# Patient Record
Sex: FEMALE | Employment: PART TIME | URBAN - METROPOLITAN AREA
[De-identification: names, ages, dates, MRNs, and addresses within clinical notes are randomized per-mention and may not be internally consistent; named-entity substitution may affect disease eponyms.]

---
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
## 2019-06-23 ENCOUNTER — OFFICE VISIT (OUTPATIENT)
Dept: FAMILY MEDICINE CLINIC | Age: 19
End: 2019-06-23

## 2019-06-23 VITALS
OXYGEN SATURATION: 96 % | WEIGHT: 144 LBS | SYSTOLIC BLOOD PRESSURE: 98 MMHG | HEART RATE: 87 BPM | HEIGHT: 57 IN | RESPIRATION RATE: 20 BRPM | DIASTOLIC BLOOD PRESSURE: 64 MMHG | TEMPERATURE: 98.6 F | BODY MASS INDEX: 31.07 KG/M2

## 2019-06-23 DIAGNOSIS — J02.9 SORE THROAT: Primary | ICD-10-CM

## 2019-06-23 DIAGNOSIS — R22.31 AXILLARY MASS, RIGHT: ICD-10-CM

## 2019-06-23 DIAGNOSIS — N64.4 BREAST TENDERNESS IN FEMALE: ICD-10-CM

## 2019-06-23 DIAGNOSIS — L03.111 CELLULITIS OF AXILLA, RIGHT: ICD-10-CM

## 2019-06-23 DIAGNOSIS — N61.0 MASTITIS IN FEMALE: ICD-10-CM

## 2019-06-23 LAB
S PYO AG THROAT QL: NEGATIVE
VALID INTERNAL CONTROL?: YES

## 2019-06-23 RX ORDER — DOXYCYCLINE 100 MG/1
100 CAPSULE ORAL 2 TIMES DAILY
Qty: 20 CAP | Refills: 0 | Status: SHIPPED | OUTPATIENT
Start: 2019-06-23 | End: 2019-07-03

## 2019-06-23 NOTE — PATIENT INSTRUCTIONS
Sore Throat: Care Instructions  Your Care Instructions    Infection by bacteria or a virus causes most sore throats. Cigarette smoke, dry air, air pollution, allergies, and yelling can also cause a sore throat. Sore throats can be painful and annoying. Fortunately, most sore throats go away on their own. If you have a bacterial infection, your doctor may prescribe antibiotics. Follow-up care is a key part of your treatment and safety. Be sure to make and go to all appointments, and call your doctor if you are having problems. It's also a good idea to know your test results and keep a list of the medicines you take. How can you care for yourself at home? · If your doctor prescribed antibiotics, take them as directed. Do not stop taking them just because you feel better. You need to take the full course of antibiotics. · Gargle with warm salt water once an hour to help reduce swelling and relieve discomfort. Use 1 teaspoon of salt mixed in 1 cup of warm water. · Take an over-the-counter pain medicine, such as acetaminophen (Tylenol), ibuprofen (Advil, Motrin), or naproxen (Aleve). Read and follow all instructions on the label. · Be careful when taking over-the-counter cold or flu medicines and Tylenol at the same time. Many of these medicines have acetaminophen, which is Tylenol. Read the labels to make sure that you are not taking more than the recommended dose. Too much acetaminophen (Tylenol) can be harmful. · Drink plenty of fluids. Fluids may help soothe an irritated throat. Hot fluids, such as tea or soup, may help decrease throat pain. · Use over-the-counter throat lozenges to soothe pain. Regular cough drops or hard candy may also help. These should not be given to young children because of the risk of choking. · Do not smoke or allow others to smoke around you. If you need help quitting, talk to your doctor about stop-smoking programs and medicines.  These can increase your chances of quitting for good. · Use a vaporizer or humidifier to add moisture to your bedroom. Follow the directions for cleaning the machine. When should you call for help? Call your doctor now or seek immediate medical care if:    · You have new or worse trouble swallowing.     · Your sore throat gets much worse on one side.    Watch closely for changes in your health, and be sure to contact your doctor if you do not get better as expected. Where can you learn more? Go to http://jorge-ashok.info/. Enter [id number] in the search box to learn more about \"Sore Throat: Care Instructions. \"  Current as of: March 27, 2018  Content Version: 11.9  © 5190-0291 [employer]. Care instructions adapted under license by [employer] (which disclaims liability or warranty for this information). If you have questions about a medical condition or this instruction, always ask your healthcare professional. [place] [address] any warranty or liability for your use of this information. Cellulitis: Care Instructions  Your Care Instructions    Cellulitis is a skin infection caused by bacteria, most often strep or staph. It often occurs after a break in the skin from a scrape, cut, bite, or puncture, or after a rash. Cellulitis may be treated without doing tests to find out what caused it. But your doctor may do tests, if needed, to look for a specific bacteria, like methicillin-resistant Staphylococcus aureus (MRSA). The doctor has checked you carefully, but problems can develop later. If you notice any problems or new symptoms, get medical treatment right away. Follow-up care is a key part of your treatment and safety. Be sure to make and go to all appointments, and call your doctor if you are having problems. It's also a good idea to know your test results and keep a list of the medicines you take. How can you care for yourself at home? · Take your antibiotics as directed.  Do not stop taking them just because you feel better. You need to take the full course of antibiotics. · Prop up the infected area on pillows to reduce pain and swelling. Try to keep the area above the level of your heart as often as you can. · If your doctor told you how to care for your wound, follow your doctor's instructions. If you did not get instructions, follow this general advice:  ? Wash the wound with clean water 2 times a day. Don't use hydrogen peroxide or alcohol, which can slow healing. ? You may cover the wound with a thin layer of petroleum jelly, such as Vaseline, and a nonstick bandage. ? Apply more petroleum jelly and replace the bandage as needed. · Be safe with medicines. Take pain medicines exactly as directed. ? If the doctor gave you a prescription medicine for pain, take it as prescribed. ? If you are not taking a prescription pain medicine, ask your doctor if you can take an over-the-counter medicine. To prevent cellulitis in the future  · Try to prevent cuts, scrapes, or other injuries to your skin. Cellulitis most often occurs where there is a break in the skin. · If you get a scrape, cut, mild burn, or bite, wash the wound with clean water as soon as you can to help avoid infection. Don't use hydrogen peroxide or alcohol, which can slow healing. · If you have swelling in your legs (edema), support stockings and good skin care may help prevent leg sores and cellulitis. · Take care of your feet, especially if you have diabetes or other conditions that increase the risk of infection. Wear shoes and socks. Do not go barefoot. If you have athlete's foot or other skin problems on your feet, talk to your doctor about how to treat them. When should you call for help? Call your doctor now or seek immediate medical care if:    · You have signs that your infection is getting worse, such as:  ? Increased pain, swelling, warmth, or redness. ? Red streaks leading from the area.   ? Pus draining from the area. ? A fever.     · You get a rash.    Watch closely for changes in your health, and be sure to contact your doctor if:    · You do not get better as expected. Where can you learn more? Go to http://jorge-ashok.info/. Raiza Kaufman in the search box to learn more about \"Cellulitis: Care Instructions. \"  Current as of: April 17, 2018  Content Version: 11.9  © 2967-2395 [employer]. Care instructions adapted under license by [employer] (which disclaims liability or warranty for this information). If you have questions about a medical condition or this instruction, always ask your healthcare professional. [place] [address] any warranty or liability for your use of this information.

## 2019-06-23 NOTE — PROGRESS NOTES
Ne Sheridan is a 22 y.o. female here today to address the following issues:  Chief Complaint   Patient presents with    Sore Throat     lump under right axillary, redness bilaterally of both eyes x 2 days with film covering eyes, painful to swallowing x 1 month. Also complaining of of swelling in right axillary region, for the past week. 4 days ago finished period. No redness or drainage noted. No fevers. She does shave her armpits. Also with some right sided breast tenderness    Sore throat for the past month. Eyes are red, but not itchy  Crusty red eyes. No sick contacts. Past Medical History:   Diagnosis Date    Headache disorder      No past surgical history on file.   Social History     Socioeconomic History    Marital status: SINGLE     Spouse name: Not on file    Number of children: Not on file    Years of education: Not on file    Highest education level: Not on file   Occupational History    Not on file   Social Needs    Financial resource strain: Not on file    Food insecurity:     Worry: Not on file     Inability: Not on file    Transportation needs:     Medical: Not on file     Non-medical: Not on file   Tobacco Use    Smoking status: Never Smoker    Smokeless tobacco: Never Used   Substance and Sexual Activity    Alcohol use: No    Drug use: No    Sexual activity: Never   Lifestyle    Physical activity:     Days per week: Not on file     Minutes per session: Not on file    Stress: Not on file   Relationships    Social connections:     Talks on phone: Not on file     Gets together: Not on file     Attends Congregational service: Not on file     Active member of club or organization: Not on file     Attends meetings of clubs or organizations: Not on file     Relationship status: Not on file    Intimate partner violence:     Fear of current or ex partner: Not on file     Emotionally abused: Not on file     Physically abused: Not on file     Forced sexual activity: Not on file   Other Topics Concern    Not on file   Social History Narrative    Not on file       No Known Allergies    Current Outpatient Medications   Medication Sig    doxycycline (VIBRAMYCIN) 100 mg capsule Take 1 Cap by mouth two (2) times a day for 10 days.  polyethylene glycol (MIRALAX) 17 gram/dose powder Put 8 capfuls of Miralax in a 32 ounce beverage and drink it, followed by 3 capfuls twice daily for the next week and follow up with your primary care physician     No current facility-administered medications for this visit. Review of Systems   Constitutional: Negative for chills and fever. HENT: Positive for sore throat. Negative for congestion and nosebleeds. Eyes: Positive for discharge. Respiratory: Negative for shortness of breath and wheezing. Cardiovascular: Negative for chest pain, palpitations and leg swelling. Gastrointestinal: Negative for abdominal pain, diarrhea, nausea and vomiting. Neurological: Negative for sensory change and speech change. Psychiatric/Behavioral: Negative for depression and suicidal ideas. A comprehensive review of systems was negative except for that written in the HPI and listed above. Visit Vitals  BP 98/64 (BP 1 Location: Left arm, BP Patient Position: Sitting)   Pulse 87   Temp 98.6 °F (37 °C) (Oral)   Resp 20   Ht 4' 9\" (1.448 m)   Wt 144 lb (65.3 kg)   SpO2 96%   BMI 31.16 kg/m²       Physical Exam   Constitutional: She is well-developed, well-nourished, and in no distress. No distress. HENT:   Head: Normocephalic and atraumatic. Right Ear: External ear normal.   Left Ear: External ear normal.   Nose: Nose normal.   Mouth/Throat: Oropharynx is clear and moist. No oropharyngeal exudate. Eyes: Conjunctivae are normal. Right eye exhibits no discharge. Left eye exhibits no discharge. Cardiovascular: Normal rate, regular rhythm and normal heart sounds. Exam reveals no gallop and no friction rub.    No murmur heard.  Pulmonary/Chest: Effort normal and breath sounds normal. No respiratory distress. She has no wheezes. She has no rales. Abdominal: Soft. Lymphadenopathy:     She has no cervical adenopathy. Neurological: She is alert. Skin: Skin is warm and dry. She is not diaphoretic. Psychiatric: Affect normal.   Nursing note and vitals reviewed. Breasts: right abnormal mass palpable in the right axilla, breast tenderness noted around 6 o clock as well, with no palpable     INDICATION: Possible cellulitis versus abscess vs mass      Using a [employer] S7 ultrasound system, a linear and curved probe was used to evaluate the right axilla. There was a 2.71 cm x 3.25 cm x 2.1 cm non-discrete, anechoic mass noted. Tender to palpation, but no surrounding or internal hyperemia or doppler positivity noted. Mild subcutaneous edema appreciated along axilla and right breast.  Does not appear to be a lymph node. Permanent images were captured and electronically archived. IMPRESSION: Cellulitis with no abscess appreciated        The credentialed provider supervising this exam was Dr. Oralia Foster MD, [facility] Eastman      Recent Results (from the past 12 hour(s))   AMB POC RAPID STREP A    Collection Time: 06/23/19  1:55 PM   Result Value Ref Range    VALID INTERNAL CONTROL POC Yes     Group A Strep Ag Negative Negative       1. Sore throat  - AMB POC RAPID STREP A    2. Axillary mass, right  - US BREAST AXILLA RT; Future    3. Cellulitis of axilla, right  - doxycycline (VIBRAMYCIN) 100 mg capsule; Take 1 Cap by mouth two (2) times a day for 10 days. Dispense: 20 Cap; Refill: 0    4. Breast tenderness in female    5. Mastitis in female    Will start doxycycline. No abscess or clear cyst appreciated on ultrasound today. We will also get a formal ultrasound to assess for this lesion after starting antibiotics. Rapid strep was negative.   Informed to follow-up in the next few days if any worsening pain, swelling, fever, or any other concerns. Follow-up and Dispositions    · Return in about 2 days (around 6/25/2019), or if symptoms worsen or fail to improve.            Haily Teixeira MD, CAQSM, RMSK

## 2019-06-23 NOTE — PROGRESS NOTES
Identified Patient with two Patient identifiers (Name and ). Two Patient Identifiers confirmed. Reviewed record in preparation for visit and have obtained necessary documentation. No chief complaint on file. There were no vitals taken for this visit. 1. Have you been to the ER, urgent care clinic since your last visit? Hospitalized since your last visit? {Yes when where and reason for visit:} 2. Have you seen or consulted any other health care providers outside of the [address] [place] [address] since your last visit? Include any pap smears or colon screening. {Yes when where and reason for visit:}

## 2019-06-23 NOTE — PROGRESS NOTES
Chief Complaint   Patient presents with    Sore Throat     lump under right axillary, redness bilaterally of both eyes x 2 days with film covering eyes, painful to swallowing x 1 month. 1. Have you been to the ER, urgent care clinic since your last visit? Hospitalized since your last visit? No      2. Have you seen or consulted any other health care providers outside of the [address] [place] [address] since your last visit? Include any pap smears or colon screening.  No      Visit Vitals  BP 98/64 (BP 1 Location: Left arm, BP Patient Position: Sitting)   Pulse 87   Temp 98.6 °F (37 °C) (Oral)   Resp 20   Ht 4' 9\" (1.448 m)   Wt 144 lb (65.3 kg)   SpO2 96%   BMI 31.16 kg/m²

## 2019-10-02 ENCOUNTER — OFFICE VISIT (OUTPATIENT)
Dept: FAMILY MEDICINE CLINIC | Age: 19
End: 2019-10-02

## 2019-10-02 VITALS
HEART RATE: 88 BPM | SYSTOLIC BLOOD PRESSURE: 103 MMHG | WEIGHT: 148 LBS | OXYGEN SATURATION: 97 % | TEMPERATURE: 98.1 F | BODY MASS INDEX: 31.93 KG/M2 | RESPIRATION RATE: 18 BRPM | DIASTOLIC BLOOD PRESSURE: 66 MMHG | HEIGHT: 57 IN

## 2019-10-02 DIAGNOSIS — R11.10 VOMITING IN ADULT: ICD-10-CM

## 2019-10-02 DIAGNOSIS — N94.6 SEVERE DYSMENORRHEA: Primary | ICD-10-CM

## 2019-10-02 LAB
HCG URINE, QL. (POC): NEGATIVE
VALID INTERNAL CONTROL?: YES

## 2019-10-02 RX ORDER — IBUPROFEN 600 MG/1
600 TABLET ORAL
Qty: 60 TAB | Refills: 1 | OUTPATIENT
Start: 2019-10-02 | End: 2021-10-19

## 2019-10-02 RX ORDER — ONDANSETRON 4 MG/1
4 TABLET, ORALLY DISINTEGRATING ORAL
Qty: 20 TAB | Refills: 1 | Status: SHIPPED | OUTPATIENT
Start: 2019-10-02

## 2019-10-02 NOTE — LETTER
NOTIFICATION RETURN TO WORK / SCHOOL 
 
10/2/2019 5:30 PM 
 
Ms. Bo James [address] [address] To Whom It May Concern: 
 
Bo James is currently under the care of [facility]. She will return to work/school on: 10/3/2019 If there are questions or concerns please have the patient contact our office. Sincerely, Onur Norman MD

## 2019-10-02 NOTE — PATIENT INSTRUCTIONS
Nausea and Vomiting: Care Instructions  Your Care Instructions    When you are nauseated, you may feel weak and sweaty and notice a lot of saliva in your mouth. Nausea often leads to vomiting. Most of the time you do not need to worry about nausea and vomiting, but they can be signs of other illnesses. Two common causes of nausea and vomiting are stomach flu and food poisoning. Nausea and vomiting from viral stomach flu will usually start to improve within 24 hours. Nausea and vomiting from food poisoning may last from 12 to 48 hours. The doctor has checked you carefully, but problems can develop later. If you notice any problems or new symptoms, get medical treatment right away. Follow-up care is a key part of your treatment and safety. Be sure to make and go to all appointments, and call your doctor if you are having problems. It's also a good idea to know your test results and keep a list of the medicines you take. How can you care for yourself at home? · To prevent dehydration, drink plenty of fluids, enough so that your urine is light yellow or clear like water. Choose water and other caffeine-free clear liquids until you feel better. If you have kidney, heart, or liver disease and have to limit fluids, talk with your doctor before you increase the amount of fluids you drink. · Rest in bed until you feel better. · When you are able to eat, try clear soups, mild foods, and liquids until all symptoms are gone for 12 to 48 hours. Other good choices include dry toast, crackers, cooked cereal, and gelatin dessert, such as Jell-O. When should you call for help? Call 911 anytime you think you may need emergency care. For example, call if:    · You passed out (lost consciousness).    Call your doctor now or seek immediate medical care if:    · You have symptoms of dehydration, such as:  ? Dry eyes and a dry mouth. ? Passing only a little dark urine. ?  Feeling thirstier than usual.     · You have new or worsening belly pain.     · You have a new or higher fever.     · You vomit blood or what looks like coffee grounds.    Watch closely for changes in your health, and be sure to contact your doctor if:    · You have ongoing nausea and vomiting.     · Your vomiting is getting worse.     · Your vomiting lasts longer than 2 days.     · You are not getting better as expected. Where can you learn more? Go to http://jorge-ashok.info/. Enter 25 [id number] in the search box to learn more about \"Nausea and Vomiting: Care Instructions. \"  Current as of: June 26, 2019  Content Version: 12.2  © 5824-0325 [employer]. Care instructions adapted under license by [employer] (which disclaims liability or warranty for this information). If you have questions about a medical condition or this instruction, always ask your healthcare professional. [address] any warranty or liability for your use of this information. Learning About Birth Control  What is birth control? Birth control is any method used to prevent pregnancy. Another word for birth control is contraception. If you have sex without birth control, there is a chance that you could get pregnant. This is true even if you have not started having periods yet or you are getting close to menopause. The only sure way to prevent pregnancy is to not have sex. But finding a good method of birth control that you are comfortable with can help you avoid an unplanned pregnancy. Be sure to tell your doctor about any health problems you have or medicines you take. He or she can help you choose the birth control method that is right for you. What are the types of birth control? There are many different kinds of birth control. Each has pros and cons. Learning about all the methods will help you find one that is right for you.   · Long-acting reversible contraception (LARC) is the most effective reversible method you can use to prevent pregnancy. If you decide you want to get pregnant, you can have them removed. LARCs are implants and intrauterine devices (IUDs). While they are being used, they usually prevent pregnancy for years. ? Implants are placed under the skin of the arm. They release the hormone progestin and prevent pregnancy for about 3 years. ? IUDs are placed in the uterus by a doctor. There are two main types of IUDs--the copper IUD and the hormonal IUD. The hormonal IUD releases progestin. IUDs prevent pregnancy for 3 to 10 years, depending on the type. · Hormonal methods are very good at preventing pregnancy. Combination birth control pills (\"the pill\"), skin patches, and vaginal rings release the hormones estrogen and progestin. Shots, mini-pills, hormonal IUDs, and implants release progestin only. · Barrier methods generally do not prevent pregnancy as well as IUDs or hormonal methods do. Barrier methods include condoms, diaphragms, cervical caps, and sponges. You must use barrier methods every time you have sex. · Natural family planning can work if you and your partner are very careful and you have a regular ovulation cycle. You will need to keep good records so you know when you are most likely to become pregnant (you are fertile). And during times you are fertile, you will need to not have sex or to use a barrier method. Natural family planning is also known as fertility awareness and the rhythm method. · Permanent birth control (sterilization) gives you lasting protection against pregnancy. A man can have a vasectomy, or a woman can have her tubes tied (tubal ligation). But this is only a good choice if you are sure that you don't want any (or any more) children. · Emergency contraception is a backup method to prevent pregnancy if you didn't use birth control or a condom breaks. The most effective emergency contraception is prescribed by a doctor.  This includes the copper IUD (inserted by a doctor) or a prescription pill. You can also get emergency contraceptive pills without a prescription at [facility]. How do you choose the best method? The best method of birth control is one that protects you every time you have sex. This usually depends on how well you use it. To find a method that will work best for you, think about:  · How well it works. Think about how important it is to you to avoid pregnancy. Then look at how well each method works. For example, if you plan to have a child soon anyway, you may not need a very reliable method. If you don't want children but feel it is wrong to end a pregnancy, choose a type of birth control that works very well. · How much effort it takes. For example, birth control pills may not be a good choice if you often forget to take medicine. Or, if you are not sure you will stop and use a barrier method each time you have sex, pick another method. · How much the method costs. For example, condoms are cheap or free in some clinics. Some insurance companies cover the cost of prescription birth control. But cost can sometimes be misleading. An IUD costs a lot up front. But it works for years, making it low-cost over time. · Whether it protects you from infection. Latex condoms can help protect you from sexually transmitted infections (STIs), such as herpes or HIV/AIDS. But they are not the best way to prevent pregnancy. To avoid both STIs and pregnancy, use condoms along with another type of birth control. · Whether you've had a problem with one kind of birth control. Finding the best method of birth control may involve trying something different. Also, you may need to change a method that once worked well for you. · Whether you want children. If you are positive you don't want children, a lasting method of birth control might be best.  · Your health issues. Some birth control methods may not be safe for you, depending on your health issues.  For example, women who smoke, are breastfeeding, or have had breast cancer may not be able to use certain methods. How can you get birth control? · You can buy:  ? Condoms, sponges, and spermicides without a prescription in drugstores, online, and in many grocery stores. ? Some forms of emergency contraception without a prescription at most drugstores. · You need to see a doctor or visit a family planning clinic to:  ? Get a prescription for birth control pills and other methods that use hormones. ? Have an implant or IUD inserted, including the type of IUD used for emergency contraception. ? Get a hormone shot. ? Get a prescription for a diaphragm or cervical cap. ? Get a prescription for certain kinds of emergency contraception. Where can you learn more? Go to http://jorge-ashok.info/. Enter L803 in the search box to learn more about \"Learning About Birth Control. \"  Current as of: May 29, 2019  Content Version: 12.2  © 0092-8773 [employer]. Care instructions adapted under license by [employer] (which disclaims liability or warranty for this information). If you have questions about a medical condition or this instruction, always ask your healthcare professional. [place] [address] any warranty or liability for your use of this information.

## 2019-10-02 NOTE — PROGRESS NOTES
Rosita Dobbs is a 22 y.o. female    Issues discussed today include:    Chief Complaint   Patient presents with    Vomiting     patient stated every time she is on her period she vomit. she feels bad cramps, nausea and vomit        1) Vomiting with menses:  Menarche at age 9yo. Periods come monthly, last 5 days. Bleeding is heavy for first 3 days. Changes pad every 6 hours. Then lightens up for 2 days. For a few yrs now has been getting very nauseated and vomiting with severe cramps with each cycle. Has never been on birth control. Takes ibuprofen 400mg with some relief of cramps. On period now and had to leave work today bc of sxs. Not sexually active currently. Data reviewed or ordered today:       Other problems include: There is no problem list on file for this patient. Medications:  Current Outpatient Medications   Medication Sig Dispense Refill    ibuprofen (MOTRIN) 600 mg tablet Take 1 Tab by mouth every six (6) hours as needed for Pain. 60 Tab 1    ondansetron (ZOFRAN ODT) 4 mg disintegrating tablet Take 1 Tab by mouth every eight (8) hours as needed for Nausea. 20 Tab 1    polyethylene glycol (MIRALAX) 17 gram/dose powder Put 8 capfuls of Miralax in a 32 ounce beverage and drink it, followed by 3 capfuls twice daily for the next week and follow up with your primary care physician 500 g 0       Allergies:  No Known Allergies    LMP:  Patient's last menstrual period was 10/01/2019 (exact date).     Social History     Socioeconomic History    Marital status: SINGLE     Spouse name: Not on file    Number of children: Not on file    Years of education: Not on file    Highest education level: Not on file   Occupational History    Not on file   Social Needs    Financial resource strain: Not on file    Food insecurity:     Worry: Not on file     Inability: Not on file    Transportation needs:     Medical: Not on file     Non-medical: Not on file   Tobacco Use    Smoking status: Never Smoker    Smokeless tobacco: Never Used   Substance and Sexual Activity    Alcohol use: No    Drug use: No    Sexual activity: Yes     Partners: Male     Birth control/protection: Condom   Lifestyle    Physical activity:     Days per week: Not on file     Minutes per session: Not on file    Stress: Not on file   Relationships    Social connections:     Talks on phone: Not on file     Gets together: Not on file     Attends Synagogue service: Not on file     Active member of club or organization: Not on file     Attends meetings of clubs or organizations: Not on file     Relationship status: Not on file    Intimate partner violence:     Fear of current or ex partner: Not on file     Emotionally abused: Not on file     Physically abused: Not on file     Forced sexual activity: Not on file   Other Topics Concern    Not on file   Social History Narrative    Not on file       History reviewed. No pertinent family history. Physical Exam   Visit Vitals  /66 (BP 1 Location: Right arm, BP Patient Position: Sitting)   Pulse 88   Temp 98.1 °F (36.7 °C) (Oral)   Resp 18   Ht 4' 9\" (1.448 m)   Wt 148 lb (67.1 kg)   LMP 10/01/2019 (Exact Date)   SpO2 97%   BMI 32.03 kg/m²      BP Readings from Last 3 Encounters:   10/02/19 103/66   06/23/19 98/64   11/02/18 105/60     Constitutional: Appears well,  No acute distress, Vitals noted  Psychiatric:  Affect normal, Alert and Oriented to person/place/time  Eyes:  Conjunctiva clear, no drainage  ENT:  External ears and nose normal, Mucous membranes moist  Neck:  General inspection normal. Supple. Lungs:  No respiratory distress  Skin:  Warm to palpation, without rashes    POC Urine Pregnancy: Negative    Assessment/Plan:      ICD-10-CM ICD-9-CM    1. Severe dysmenorrhea N94.6 625.3 ibuprofen (MOTRIN) 600 mg tablet      ondansetron (ZOFRAN ODT) 4 mg disintegrating tablet   2.  Vomiting in adult R11.10 787.03 AMB POC URINE PREGNANCY TEST, VISUAL COLOR COMPARISON Pt feels her nausea is 2/2 pain, mod relief with motrin so requests higher dose and anti-emetic. Declines hormonal tx for now, handout given with options we discussed in case she wants to try this in the future    Follow-up and Dispositions    · Return if symptoms worsen or fail to improve.          Clara Watesr MD

## 2021-01-01 NOTE — ED TRIAGE NOTES
Patient states she has not had a BM since Monday, and when she tries she bleeds, and then she is having urinary frequency, burning, and only a small amount of urine comes
1)Re-gain 100% BW. 2)Avg wt gain >/=20-35Gm/d. 3)Intake >/=120cal/Kg/d. 4)Feeding 100% PO

## 2021-10-19 ENCOUNTER — HOSPITAL ENCOUNTER (EMERGENCY)
Age: 21
Discharge: HOME | End: 2021-10-19
Attending: STUDENT IN AN ORGANIZED HEALTH CARE EDUCATION/TRAINING PROGRAM

## 2021-10-19 VITALS
SYSTOLIC BLOOD PRESSURE: 121 MMHG | TEMPERATURE: 98.9 F | HEART RATE: 87 BPM | DIASTOLIC BLOOD PRESSURE: 75 MMHG | OXYGEN SATURATION: 99 % | HEIGHT: 57 IN | RESPIRATION RATE: 16 BRPM | BODY MASS INDEX: 31.28 KG/M2 | WEIGHT: 145 LBS

## 2021-10-19 DIAGNOSIS — J02.9 ACUTE PHARYNGITIS, UNSPECIFIED ETIOLOGY: ICD-10-CM

## 2021-10-19 DIAGNOSIS — N30.00 ACUTE CYSTITIS WITHOUT HEMATURIA: Primary | ICD-10-CM

## 2021-10-19 LAB
APPEARANCE UR: ABNORMAL
BACTERIA URNS QL MICRO: ABNORMAL /HPF
BILIRUB UR QL: NEGATIVE
COLOR UR: ABNORMAL
DEPRECATED S PYO AG THROAT QL EIA: NEGATIVE
EPITH CASTS URNS QL MICRO: ABNORMAL /LPF
GLUCOSE UR STRIP.AUTO-MCNC: NEGATIVE MG/DL
HCG UR QL: NEGATIVE
HGB UR QL STRIP: ABNORMAL
KETONES UR QL STRIP.AUTO: NEGATIVE MG/DL
LEUKOCYTE ESTERASE UR QL STRIP.AUTO: ABNORMAL
MUCOUS THREADS URNS QL MICRO: ABNORMAL /LPF
NITRITE UR QL STRIP.AUTO: POSITIVE
PH UR STRIP: 6 [PH] (ref 5–8)
PROT UR STRIP-MCNC: NEGATIVE MG/DL
RBC #/AREA URNS HPF: ABNORMAL /HPF (ref 0–5)
SP GR UR REFRACTOMETRY: 1.02 (ref 1–1.03)
UR CULT HOLD, URHOLD: NORMAL
UROBILINOGEN UR QL STRIP.AUTO: 0.2 EU/DL (ref 0.2–1)
WBC URNS QL MICRO: ABNORMAL /HPF (ref 0–4)

## 2021-10-19 PROCEDURE — 81001 URINALYSIS AUTO W/SCOPE: CPT

## 2021-10-19 PROCEDURE — 87077 CULTURE AEROBIC IDENTIFY: CPT

## 2021-10-19 PROCEDURE — 87880 STREP A ASSAY W/OPTIC: CPT

## 2021-10-19 PROCEDURE — 87491 CHLMYD TRACH DNA AMP PROBE: CPT

## 2021-10-19 PROCEDURE — 87147 CULTURE TYPE IMMUNOLOGIC: CPT

## 2021-10-19 PROCEDURE — 99282 EMERGENCY DEPT VISIT SF MDM: CPT

## 2021-10-19 PROCEDURE — 81025 URINE PREGNANCY TEST: CPT

## 2021-10-19 PROCEDURE — 87086 URINE CULTURE/COLONY COUNT: CPT

## 2021-10-19 PROCEDURE — 87070 CULTURE OTHR SPECIMN AEROBIC: CPT

## 2021-10-19 PROCEDURE — 87186 SC STD MICRODIL/AGAR DIL: CPT

## 2021-10-19 RX ORDER — IBUPROFEN 600 MG/1
600 TABLET ORAL
Qty: 20 TABLET | Refills: 0 | Status: SHIPPED | OUTPATIENT
Start: 2021-10-19

## 2021-10-19 RX ORDER — CEPHALEXIN 500 MG/1
500 CAPSULE ORAL 4 TIMES DAILY
Qty: 28 CAPSULE | Refills: 0 | Status: SHIPPED | OUTPATIENT
Start: 2021-10-19 | End: 2021-10-26

## 2021-10-19 RX ORDER — PHENAZOPYRIDINE HYDROCHLORIDE 200 MG/1
200 TABLET, FILM COATED ORAL 3 TIMES DAILY
Qty: 6 TABLET | Refills: 0 | Status: SHIPPED | OUTPATIENT
Start: 2021-10-19 | End: 2021-10-21

## 2021-10-19 NOTE — ED TRIAGE NOTES
Patient reports she started with a sore throat 3-4 days ago and she started with hematuria last night and a fever today-    Patient also reports burning with urination-

## 2021-10-19 NOTE — LETTER
NOTIFICATION RETURN TO WORK / SCHOOL    10/19/2021 3:38 PM    Ms. Kaden Bonilla Raven  [address] [facility]      To Whom It May Concern:    Lynne Garcia is currently under the care of [facility] EMERGENCY DEPT. She will return to work/school on: 10/22/2021  If there are questions or concerns please have the patient contact our office.         Sincerely,      Fadi Hardwick NP

## 2021-10-19 NOTE — ED PROVIDER NOTES
HPI patient is a 65-year-old female with past medical history significant for frequent headaches who presents to the ED with a sore throat for 3 to 4 days and hematuria since last evening. She states that she woke up with a tactile fever this morning and mild dysuria. She took Motrin with some relief noted. Denies  neck pain, visual changes, focal weakness or rash. Denies any  difficulty breathing, difficulty swallowing, SOB or chest pain. Denies any nausea, vomiting or diarrhea. Pt. Reports LMP 9/20/2021. Past Medical History:   Diagnosis Date    Headache disorder        No past surgical history on file. No family history on file. Social History     Socioeconomic History    Marital status: SINGLE     Spouse name: Not on file    Number of children: Not on file    Years of education: Not on file    Highest education level: Not on file   Occupational History    Not on file   Tobacco Use    Smoking status: Never Smoker    Smokeless tobacco: Never Used   Substance and Sexual Activity    Alcohol use: No    Drug use: No    Sexual activity: Yes     Partners: Male     Birth control/protection: Condom   Other Topics Concern    Not on file   Social History Narrative    Not on file     Social Determinants of Health     Financial Resource Strain:     Difficulty of Paying Living Expenses:    Food Insecurity:     Worried About Running Out of Food in the Last Year:     [address] in the Last Year:    Transportation Needs:     Lack of Transportation (Medical):      Lack of Transportation (Non-Medical):    Physical Activity:     Days of Exercise per Week:     Minutes of Exercise per Session:    Stress:     Feeling of Stress :    Social Connections:     Frequency of Communication with Friends and Family:     Frequency of Social Gatherings with Friends and Family:     Attends Protestant Services:     Active Member of Clubs or Organizations:     Attends Club or Organization Meetings:    [facility] Marital Status:    Intimate Partner Violence:     Fear of Current or Ex-Partner:     Emotionally Abused:     Physically Abused:     Sexually Abused: ALLERGIES: Patient has no known allergies. Review of Systems   Constitutional: Negative for activity change, fever and unexpected weight change. HENT: Positive for sore throat. Negative for congestion. Eyes: Negative for visual disturbance. Respiratory: Negative for cough and shortness of breath. Cardiovascular: Negative for chest pain, palpitations and leg swelling. Gastrointestinal: Negative for abdominal pain, diarrhea, nausea and vomiting. Genitourinary: Positive for dysuria. Negative for difficulty urinating and flank pain. Musculoskeletal: Negative for arthralgias and gait problem. Skin: Negative for rash. All other systems reviewed and are negative. Vitals:    10/19/21 1429   BP: 121/75   Pulse: 87   Resp: 16   Temp: 98.9 °F (37.2 °C)   SpO2: 99%   Weight: 65.8 kg (145 lb)   Height: 4' 9\" (1.448 m)            Physical Exam  Vitals and nursing note reviewed. Constitutional:       General: She is not in acute distress. Appearance: She is well-developed. She is not ill-appearing, toxic-appearing or diaphoretic. Comments: Female; non smoker   HENT:      Head: Normocephalic. Right Ear: Tympanic membrane normal.      Left Ear: Tympanic membrane normal.      Nose: No congestion or rhinorrhea. Mouth/Throat:      Mouth: Mucous membranes are moist. No oral lesions. Pharynx: Uvula midline. Posterior oropharyngeal erythema present. Tonsils: No tonsillar exudate or tonsillar abscesses. Pulmonary:      Effort: Pulmonary effort is normal.      Breath sounds: Normal breath sounds. Abdominal:      General: Bowel sounds are normal.      Palpations: Abdomen is soft. Skin:     General: Skin is warm. Findings: No rash.    Neurological:      Mental Status: She is alert and oriented to person, place, and time.          MDM       Procedures    Labs Reviewed   URINALYSIS W/MICROSCOPIC - Abnormal; Notable for the following components:       Result Value    Appearance CLOUDY (*)     Blood TRACE (*)     Nitrites Positive (*)     Leukocyte Esterase MODERATE (*)     Bacteria 4+ (*)     Mucus 1+ (*)     All other components within normal limits   STREP AG SCREEN, GROUP A   CULTURE, THROAT   URINE CULTURE HOLD SAMPLE   CULTURE, URINE   CHLAMYDIA / GC-AMPLIFIED   HCG URINE, QL. - POC         Patient has been reexamined and denies any further complaints of pain or discomfort. Recommend close follow-up with urologist if symptoms persist.  Urine and throat cultures pending. 3:57 PM  Patient's results and plan of care have been reviewed with her. Patient has verbally conveyed her understanding and agreement of her signs, symptoms, diagnosis, treatment and prognosis and additionally agrees to follow up as recommended or return to the Emergency Room should her condition change prior to follow-up. Discharge instructions have also been provided to the patient with some educational information regarding her diagnosis as well a list of reasons why she would want to return to the ER prior to her follow-up appointment should her condition change. Danish Hickman NP

## 2021-10-21 LAB
BACTERIA SPEC CULT: ABNORMAL
CC UR VC: ABNORMAL
SERVICE CMNT-IMP: ABNORMAL
SERVICE CMNT-IMP: ABNORMAL

## 2021-10-22 LAB
C TRACH RRNA SPEC QL NAA+PROBE: NEGATIVE
N GONORRHOEA RRNA SPEC QL NAA+PROBE: NEGATIVE
SPECIMEN SOURCE: NORMAL

## 2022-05-16 ENCOUNTER — HOSPITAL ENCOUNTER (EMERGENCY)
Age: 22
Discharge: HOME | End: 2022-05-16
Attending: EMERGENCY MEDICINE

## 2022-05-16 ENCOUNTER — APPOINTMENT (OUTPATIENT)
Dept: GENERAL RADIOLOGY | Age: 22
End: 2022-05-16
Attending: EMERGENCY MEDICINE

## 2022-05-16 VITALS
SYSTOLIC BLOOD PRESSURE: 137 MMHG | DIASTOLIC BLOOD PRESSURE: 84 MMHG | RESPIRATION RATE: 16 BRPM | HEIGHT: 57 IN | WEIGHT: 140 LBS | TEMPERATURE: 97.7 F | BODY MASS INDEX: 30.2 KG/M2 | OXYGEN SATURATION: 98 % | HEART RATE: 107 BPM

## 2022-05-16 DIAGNOSIS — S89.91XA INJURY OF RIGHT KNEE, INITIAL ENCOUNTER: Primary | ICD-10-CM

## 2022-05-16 PROCEDURE — 73562 X-RAY EXAM OF KNEE 3: CPT

## 2022-05-16 PROCEDURE — 99283 EMERGENCY DEPT VISIT LOW MDM: CPT

## 2022-05-16 PROCEDURE — 74011250637 HC RX REV CODE- 250/637: Performed by: STUDENT IN AN ORGANIZED HEALTH CARE EDUCATION/TRAINING PROGRAM

## 2022-05-16 RX ORDER — ACETAMINOPHEN 500 MG
1000 TABLET ORAL ONCE
Status: COMPLETED | OUTPATIENT
Start: 2022-05-16 | End: 2022-05-16

## 2022-05-16 RX ADMIN — ACETAMINOPHEN 1000 MG: 500 TABLET ORAL at 23:16

## 2022-05-16 NOTE — Clinical Note
Francisca Villalobos  [facility] EMERGENCY DEPT  [id number]  [phone]    Work/School Note    Date: 5/16/2022    To Whom It May concern:    Bobo Mancilla was seen and treated today in the emergency room by the following provider(s):  Attending Provider: Annalise Tay MD  Physician Assistant: WU Lo. Bobo Mancilla is excused from work/school on 5/16/2022 through 5/18/2022. She is medically clear to return to work/school on 5/19/2022.          Sincerely,          WU Miller

## 2022-05-17 NOTE — ED TRIAGE NOTES
Patient was running and fell on her right knee on wood floor about 1 hour ago. Now is experiencing pain and states that it \"feels hot on the inside\". Denies hitting head or LOC.

## 2022-05-17 NOTE — ED PROVIDER NOTES
79-year-old female with history of chronic headaches presents to ED with right knee pain status post fall. Patient reports that she was running through her house and tripped over something, falling directly on her right knee on the wood floor. She notes that she noted pain right after but since then the pain is worsened and she is having trouble bearing weight. She did not take anything for the pain. She denies any extremity swelling, numbness, tingling, head trauma, LOC, headaches, vision changes. No history of similar episodes. The history is provided by the patient. Knee Injury  Pertinent negatives include no chest pain, no headaches and no shortness of breath. Past Medical History:   Diagnosis Date    Headache disorder        No past surgical history on file. No family history on file. Social History     Socioeconomic History    Marital status: SINGLE     Spouse name: Not on file    Number of children: Not on file    Years of education: Not on file    Highest education level: Not on file   Occupational History    Not on file   Tobacco Use    Smoking status: Never Smoker    Smokeless tobacco: Never Used   Substance and Sexual Activity    Alcohol use: No    Drug use: No    Sexual activity: Yes     Partners: Male     Birth control/protection: Condom   Other Topics Concern    Not on file   Social History Narrative    Not on file     Social Determinants of Health     Financial Resource Strain:     Difficulty of Paying Living Expenses: Not on file   Food Insecurity:     Worried About Running Out of Food in the Last Year: Not on file    Donnell of Food in the Last Year: Not on file   Transportation Needs:     Lack of Transportation (Medical): Not on file    Lack of Transportation (Non-Medical):  Not on file   Physical Activity:     Days of Exercise per Week: Not on file    Minutes of Exercise per Session: Not on file   Stress:     Feeling of Stress : Not on file   Social Connections:     Frequency of Communication with Friends and Family: Not on file    Frequency of Social Gatherings with Friends and Family: Not on file    Attends Worship Services: Not on file    Active Member of Clubs or Organizations: Not on file    Attends Club or Organization Meetings: Not on file    Marital Status: Not on file   Intimate Partner Violence:     Fear of Current or Ex-Partner: Not on file    Emotionally Abused: Not on file    Physically Abused: Not on file    Sexually Abused: Not on file   Housing Stability:     Unable to Pay for Housing in the Last Year: Not on file    Number of [place] in the Last Year: Not on file    Unstable Housing in the Last Year: Not on file         ALLERGIES: Patient has no known allergies. Review of Systems   Constitutional: Negative for fever. HENT: Negative for congestion and sinus pressure. Respiratory: Negative for shortness of breath. Cardiovascular: Negative for chest pain. Gastrointestinal: Negative for nausea and vomiting. Genitourinary: Negative for dysuria. Musculoskeletal: Positive for arthralgias. Negative for myalgias. Neurological: Negative for dizziness and headaches. Hematological: Negative for adenopathy. Psychiatric/Behavioral: The patient is not nervous/anxious. All other systems reviewed and are negative. Vitals:    05/16/22 2242   BP: 137/84   Pulse: (!) 107   Resp: 16   Temp: 97.7 °F (36.5 °C)   SpO2: 98%   Weight: 63.5 kg (140 lb)   Height: 4' 9\" (1.448 m)            Physical Exam  Vitals and nursing note reviewed. Constitutional:       General: She is not in acute distress. Appearance: Normal appearance. She is normal weight. HENT:      Head: Normocephalic and atraumatic. Eyes:      Extraocular Movements: Extraocular movements intact. Pupils: Pupils are equal, round, and reactive to light. Cardiovascular:      Rate and Rhythm: Normal rate and regular rhythm.       Heart sounds: Normal heart sounds. Pulmonary:      Breath sounds: Normal breath sounds. Abdominal:      Palpations: Abdomen is soft. Tenderness: There is no abdominal tenderness. Musculoskeletal:      Right knee: No swelling, deformity or crepitus. Normal range of motion. Tenderness present over the medial joint line and lateral joint line. No LCL laxity, MCL laxity, ACL laxity or PCL laxity. Left knee: Normal.   Lymphadenopathy:      Cervical: No cervical adenopathy. Skin:     General: Skin is warm and dry. Neurological:      General: No focal deficit present. Mental Status: She is alert and oriented to person, place, and time. Psychiatric:         Mood and Affect: Mood normal.         Behavior: Behavior normal.         Thought Content: Thought content normal.          MDM  Number of Diagnoses or Management Options  Injury of right knee, initial encounter  Diagnosis management comments: 18-year-old female with history of chronic headaches presents to ED with right knee pain status post fall. Vital signs stable in triage. Physical exam notable for right knee tenderness but no swelling, decreased range of motion or deformity. X-ray of right knee showed no acute abnormality. Patient received Tylenol p.o. while in ED with relief of symptoms. Patient will be discharged with instructions for conservative care, follow-up with Ortho or PCP and return precautions.        Amount and/or Complexity of Data Reviewed  Tests in the radiology section of CPT®: ordered and reviewed  Discuss the patient with other providers: yes           Procedures

## 2022-05-17 NOTE — DISCHARGE INSTRUCTIONS
Take new medications as prescribed. You can take Advil or Tylenol as needed for pain. Using ice can help as well. Please avoid strenuous physical activity and stretch as you are able. Please follow-up with your PCP and ortho as soon as possible.

## 2024-04-08 ENCOUNTER — OFFICE VISIT (OUTPATIENT)
Age: 24
End: 2024-04-08

## 2024-04-08 VITALS
HEIGHT: 57 IN | OXYGEN SATURATION: 99 % | SYSTOLIC BLOOD PRESSURE: 123 MMHG | HEART RATE: 87 BPM | BODY MASS INDEX: 36.24 KG/M2 | DIASTOLIC BLOOD PRESSURE: 82 MMHG | TEMPERATURE: 99 F | WEIGHT: 168 LBS

## 2024-04-08 DIAGNOSIS — Z23 NEED FOR DIPHTHERIA-TETANUS-PERTUSSIS (TDAP) VACCINE: ICD-10-CM

## 2024-04-08 DIAGNOSIS — S69.92XA FINGER INJURY, LEFT, INITIAL ENCOUNTER: Primary | ICD-10-CM

## 2024-04-08 RX ORDER — CEPHALEXIN 500 MG/1
500 CAPSULE ORAL 3 TIMES DAILY
Qty: 30 CAPSULE | Refills: 0 | Status: SHIPPED | OUTPATIENT
Start: 2024-04-08 | End: 2024-04-18

## 2024-04-08 ASSESSMENT — ENCOUNTER SYMPTOMS
EYES NEGATIVE: 1
GASTROINTESTINAL NEGATIVE: 1
ALLERGIC/IMMUNOLOGIC NEGATIVE: 1
RESPIRATORY NEGATIVE: 1

## 2024-04-08 NOTE — PROGRESS NOTES
2024   Gill Jacinto (: 2000) is a 23 y.o. female, New patient, here for evaluation of the following chief complaint(s):  Finger Injury (20 mins ago pt slammed finger in door, the nail is hanging off. Just put bandaid on it. Left pinky. )     ASSESSMENT/PLAN:  Below is the assessment and plan developed based on review of pertinent history, physical exam, labs, studies, and medications.  1. Finger injury, left, initial encounter  -     XR HAND LEFT (MIN 3 VIEWS); Future  -     Tdap, BOOSTRIX, (age 10 yrs+), IM  -     cephALEXin (KEFLEX) 500 MG capsule; Take 1 capsule by mouth 3 times daily for 10 days, Disp-30 capsule, R-0Normal  2. Need for diphtheria-tetanus-pertussis (Tdap) vaccine  -     Tdap, BOOSTRIX, (age 10 yrs+), IM         Handout given with care instructions  2. OTC for symptom management. Increase fluid intake, ensure adequate nutritional intake.  3. Follow up with PCP as needed.  4. Go to ED with development of any acute symptoms.     Follow up:  Return if symptoms worsen or fail to improve.  Follow up immediately for any new, worsening or changes or if symptoms are not improving over the next 5-7 days.     SUBJECTIVE/OBJECTIVE:  HPI     Diagnoses and all orders for this visit:  Finger injury, left, initial encounter  Need for diphtheria-tetanus-pertussis (Tdap) vaccine  Finger Injury (20 mins ago pt slammed finger in door, the nail is hanging off. Just put bandaid on it. Left pinky. )  X-ray done and reviewed.  Awaiting radiology review.      NAIL REMOVAL    Date/Time: 2024 7:53 PM    Performed by: Mercy Obando MD  Authorized by: Mercy Obando MD  Location: left hand  Location details: left small finger  Anesthesia: local infiltration and digital block    Anesthesia:  Local Anesthetic: lidocaine 1% without epinephrine and topical anesthetic  Anesthetic total: 3 mL    Sedation:  Patient sedated: no    Preparation: skin prepped with providone-iodine  Amount removed:

## 2024-04-09 ENCOUNTER — TELEPHONE (OUTPATIENT)
Age: 24
End: 2024-04-09

## 2025-06-16 ENCOUNTER — OFFICE VISIT (OUTPATIENT)
Facility: CLINIC | Age: 25
End: 2025-06-16
Payer: COMMERCIAL

## 2025-06-16 VITALS
SYSTOLIC BLOOD PRESSURE: 125 MMHG | HEART RATE: 93 BPM | WEIGHT: 168 LBS | BODY MASS INDEX: 36.24 KG/M2 | OXYGEN SATURATION: 98 % | DIASTOLIC BLOOD PRESSURE: 89 MMHG | HEIGHT: 57 IN

## 2025-06-16 DIAGNOSIS — M54.2 CERVICALGIA: Primary | ICD-10-CM

## 2025-06-16 DIAGNOSIS — Z31.9 DESIRE FOR PREGNANCY: ICD-10-CM

## 2025-06-16 LAB — TSH SERPL DL<=0.05 MIU/L-ACNC: 1.31 UIU/ML (ref 0.36–3.74)

## 2025-06-16 PROCEDURE — 99203 OFFICE O/P NEW LOW 30 MIN: CPT | Performed by: INTERNAL MEDICINE

## 2025-06-16 ASSESSMENT — PATIENT HEALTH QUESTIONNAIRE - PHQ9
1. LITTLE INTEREST OR PLEASURE IN DOING THINGS: NOT AT ALL
SUM OF ALL RESPONSES TO PHQ QUESTIONS 1-9: 0
2. FEELING DOWN, DEPRESSED OR HOPELESS: NOT AT ALL
SUM OF ALL RESPONSES TO PHQ QUESTIONS 1-9: 0

## 2025-06-16 NOTE — PROGRESS NOTES
A/P:      1. Cervicalgia  Likely. No deficits, but symptoms suggestive of cervical disc disease. Suggested she use tizanidine at bedtime, watching for sedation and start PT. Follow up if no resolving.  - tiZANidine (ZANAFLEX) 4 MG tablet; Take 1 tablet by mouth at bedtime  Dispense: 30 tablet; Refill: 1  - [facility]    2. Desire for pregnancy  Advised her to start taking a pre- vitamin with folic acid. Will check TSH today.   - TSH; Future           An electronic signature was used to authenticate this note.    --Liza Suarez MD         HPI: Gill Jacinto is here to establish a new PCP.     Headaches: She has been feeling tired and having headaches since February. There is throbbing in the front and back  of her head, usually once a day, toward lunch time. She also has neck pain, tension in the neck when she wakes up. There is no vomiting or vision changes; she will have some tingling in her fingers and arm on left will tingle if she has been lying a certain way or looking down for a long time. There is no arm pain. The pain is improved temporarily with OTC NSAID or acetaminophen.     She had her  are thinking about beginning to try and have a child in December of this year.       There is no problem list on file for this patient.         History reviewed. No pertinent past medical history.       History reviewed. No pertinent surgical history.         Family History   Problem Relation Age of Onset    No Known Problems Mother     No Known Problems Father     Rheum Arthritis Maternal Grandfather           Social History     Tobacco Use    Smoking status: Never    Smokeless tobacco: Never   Vaping Use    Vaping status: Never Used   Substance Use Topics    Alcohol use: No    Drug use: No          Current Outpatient Medications:     Ascorbic Acid (VITAMIN C PO), Take by mouth daily, Disp: , Rfl:     VITAMIN E PO, Take by mouth daily, Disp: , Rfl:

## 2025-06-17 ENCOUNTER — RESULTS FOLLOW-UP (OUTPATIENT)
Facility: CLINIC | Age: 25
End: 2025-06-17